# Patient Record
Sex: FEMALE | Race: OTHER
[De-identification: names, ages, dates, MRNs, and addresses within clinical notes are randomized per-mention and may not be internally consistent; named-entity substitution may affect disease eponyms.]

---

## 2018-08-12 ENCOUNTER — HOSPITAL ENCOUNTER (INPATIENT)
Dept: HOSPITAL 62 - ER | Age: 43
LOS: 3 days | Discharge: HOME | DRG: 639 | End: 2018-08-15
Attending: FAMILY MEDICINE | Admitting: FAMILY MEDICINE
Payer: SELF-PAY

## 2018-08-12 DIAGNOSIS — E10.10: Primary | ICD-10-CM

## 2018-08-12 DIAGNOSIS — R07.9: ICD-10-CM

## 2018-08-12 DIAGNOSIS — Z91.120: ICD-10-CM

## 2018-08-12 DIAGNOSIS — M54.5: ICD-10-CM

## 2018-08-12 LAB
ADD MANUAL DIFF: NO
ALBUMIN SERPL-MCNC: 5.1 G/DL (ref 3.5–5)
ALP SERPL-CCNC: 207 U/L (ref 38–126)
ALT SERPL-CCNC: 23 U/L (ref 9–52)
ANION GAP SERPL CALC-SCNC: 27 MMOL/L (ref 5–19)
APPEARANCE UR: (no result)
APTT PPP: YELLOW S
AST SERPL-CCNC: 10 U/L (ref 14–36)
BASE EXCESS BLDV CALC-SCNC: -21.1 MMOL/L
BASE EXCESS BLDV CALC-SCNC: -23.1 MMOL/L
BASOPHILS # BLD AUTO: 0 10^3/UL (ref 0–0.2)
BASOPHILS NFR BLD AUTO: 0.2 % (ref 0–2)
BILIRUB DIRECT SERPL-MCNC: 0.3 MG/DL (ref 0–0.4)
BILIRUB SERPL-MCNC: 0.8 MG/DL (ref 0.2–1.3)
BILIRUB UR QL STRIP: NEGATIVE
BUN SERPL-MCNC: 10 MG/DL (ref 7–20)
BUN SERPL-MCNC: 9 MG/DL (ref 7–20)
CALCIUM: 8.7 MG/DL (ref 8.4–10.2)
CALCIUM: 9.8 MG/DL (ref 8.4–10.2)
CHLORIDE SERPL-SCNC: 109 MMOL/L (ref 98–107)
CHLORIDE SERPL-SCNC: 116 MMOL/L (ref 98–107)
CK MB SERPL-MCNC: 0.58 NG/ML (ref ?–4.55)
CK SERPL-CCNC: 27 U/L (ref 30–135)
CO2 SERPL-SCNC: 6 MMOL/L (ref 22–30)
CO2 SERPL-SCNC: < 5 MMOL/L (ref 22–30)
EOSINOPHIL # BLD AUTO: 0 10^3/UL (ref 0–0.6)
EOSINOPHIL NFR BLD AUTO: 0 % (ref 0–6)
ERYTHROCYTE [DISTWIDTH] IN BLOOD BY AUTOMATED COUNT: 17.2 % (ref 11.5–14)
GLUCOSE SERPL-MCNC: 258 MG/DL (ref 75–110)
GLUCOSE SERPL-MCNC: 332 MG/DL (ref 75–110)
GLUCOSE UR STRIP-MCNC: >=500 MG/DL
HCO3 BLDV-SCNC: 8.6 MMOL/L (ref 20–32)
HCO3 BLDV-SCNC: 8.7 MMOL/L (ref 20–32)
HCT VFR BLD CALC: 53.6 % (ref 36–47)
HGB BLD-MCNC: 17.6 G/DL (ref 12–15.5)
KETONES UR STRIP-MCNC: 80 MG/DL
LYMPHOCYTES # BLD AUTO: 0.9 10^3/UL (ref 0.5–4.7)
LYMPHOCYTES NFR BLD AUTO: 11.1 % (ref 13–45)
MCH RBC QN AUTO: 32.4 PG (ref 27–33.4)
MCHC RBC AUTO-ENTMCNC: 32.8 G/DL (ref 32–36)
MCV RBC AUTO: 99 FL (ref 80–97)
MONOCYTES # BLD AUTO: 0.6 10^3/UL (ref 0.1–1.4)
MONOCYTES NFR BLD AUTO: 7.7 % (ref 3–13)
NEUTROPHILS # BLD AUTO: 6.3 10^3/UL (ref 1.7–8.2)
NEUTS SEG NFR BLD AUTO: 81 % (ref 42–78)
NITRITE UR QL STRIP: NEGATIVE
PCO2 BLDV: 33.5 MMHG (ref 35–63)
PCO2 BLDV: 39.4 MMHG (ref 35–63)
PH BLDV: 6.96 [PH] (ref 7.3–7.42)
PH BLDV: 7.03 [PH] (ref 7.3–7.42)
PH UR STRIP: 5 [PH] (ref 5–9)
PHOSPHATE SERPL-MCNC: 4.1 MG/DL (ref 2.5–4.5)
PLATELET # BLD: 273 10^3/UL (ref 150–450)
POTASSIUM SERPL-SCNC: 3.9 MMOL/L (ref 3.6–5)
POTASSIUM SERPL-SCNC: 4.6 MMOL/L (ref 3.6–5)
PROT SERPL-MCNC: 9 G/DL (ref 6.3–8.2)
PROT UR STRIP-MCNC: 100 MG/DL
RBC # BLD AUTO: 5.42 10^6/UL (ref 3.72–5.28)
SODIUM SERPL-SCNC: 139.7 MMOL/L (ref 137–145)
SODIUM SERPL-SCNC: 142.4 MMOL/L (ref 137–145)
SP GR UR STRIP: 1.03
TOTAL CELLS COUNTED % (AUTO): 100 %
TROPONIN I SERPL-MCNC: < 0.012 NG/ML
UROBILINOGEN UR-MCNC: NEGATIVE MG/DL (ref ?–2)
WBC # BLD AUTO: 7.8 10^3/UL (ref 4–10.5)

## 2018-08-12 PROCEDURE — 80048 BASIC METABOLIC PNL TOTAL CA: CPT

## 2018-08-12 PROCEDURE — 83605 ASSAY OF LACTIC ACID: CPT

## 2018-08-12 PROCEDURE — 96361 HYDRATE IV INFUSION ADD-ON: CPT

## 2018-08-12 PROCEDURE — 87040 BLOOD CULTURE FOR BACTERIA: CPT

## 2018-08-12 PROCEDURE — 80053 COMPREHEN METABOLIC PANEL: CPT

## 2018-08-12 PROCEDURE — 82962 GLUCOSE BLOOD TEST: CPT

## 2018-08-12 PROCEDURE — S0119 ONDANSETRON 4 MG: HCPCS

## 2018-08-12 PROCEDURE — 84443 ASSAY THYROID STIM HORMONE: CPT

## 2018-08-12 PROCEDURE — 36415 COLL VENOUS BLD VENIPUNCTURE: CPT

## 2018-08-12 PROCEDURE — 84100 ASSAY OF PHOSPHORUS: CPT

## 2018-08-12 PROCEDURE — 81025 URINE PREGNANCY TEST: CPT

## 2018-08-12 PROCEDURE — 85025 COMPLETE CBC W/AUTO DIFF WBC: CPT

## 2018-08-12 PROCEDURE — 84484 ASSAY OF TROPONIN QUANT: CPT

## 2018-08-12 PROCEDURE — 82803 BLOOD GASES ANY COMBINATION: CPT

## 2018-08-12 PROCEDURE — 83036 HEMOGLOBIN GLYCOSYLATED A1C: CPT

## 2018-08-12 PROCEDURE — 85379 FIBRIN DEGRADATION QUANT: CPT

## 2018-08-12 PROCEDURE — 36600 WITHDRAWAL OF ARTERIAL BLOOD: CPT

## 2018-08-12 PROCEDURE — 96374 THER/PROPH/DIAG INJ IV PUSH: CPT

## 2018-08-12 PROCEDURE — 82553 CREATINE MB FRACTION: CPT

## 2018-08-12 PROCEDURE — 81001 URINALYSIS AUTO W/SCOPE: CPT

## 2018-08-12 PROCEDURE — 71275 CT ANGIOGRAPHY CHEST: CPT

## 2018-08-12 PROCEDURE — 93010 ELECTROCARDIOGRAM REPORT: CPT

## 2018-08-12 PROCEDURE — 83735 ASSAY OF MAGNESIUM: CPT

## 2018-08-12 PROCEDURE — 99291 CRITICAL CARE FIRST HOUR: CPT

## 2018-08-12 PROCEDURE — 82010 KETONE BODYS QUAN: CPT

## 2018-08-12 PROCEDURE — 71046 X-RAY EXAM CHEST 2 VIEWS: CPT

## 2018-08-12 PROCEDURE — 93005 ELECTROCARDIOGRAM TRACING: CPT

## 2018-08-12 PROCEDURE — 82550 ASSAY OF CK (CPK): CPT

## 2018-08-12 RX ADMIN — ACETAMINOPHEN PRN MG: 325 TABLET ORAL at 23:46

## 2018-08-12 NOTE — RADIOLOGY REPORT (SQ)
EXAM DESCRIPTION:  CHEST 2 VIEWS



COMPLETED DATE/TIME:  8/12/2018 6:18 pm



REASON FOR STUDY:  cp, sob



COMPARISON:  None.



EXAM PARAMETERS:  NUMBER OF VIEWS: two views

TECHNIQUE: Digital Frontal and Lateral radiographic views of the chest acquired.

RADIATION DOSE: NA

LIMITATIONS: none



FINDINGS:  LUNGS AND PLEURA: No consolidation, pneumothorax or pleural effusion.

MEDIASTINUM AND HILAR STRUCTURES: No masses or contour abnormalities.

HEART AND VASCULAR STRUCTURES: Heart normal size.  No evidence for failure.

BONES: No acute findings.

HARDWARE: None in the chest.



IMPRESSION:  NO ACUTE RADIOGRAPHIC FINDING IN THE CHEST.



TECHNICAL DOCUMENTATION:  JOB ID:  [id number]

OH-64

 2011 [employer]- All Rights Reserved



Reading location - IP/workstation name: [email or web address]

## 2018-08-12 NOTE — RADIOLOGY REPORT (SQ)
EXAM DESCRIPTION:  CTA CHEST



COMPLETED DATE/TIME:  8/12/2018 7:19 pm



REASON FOR STUDY:  cp, sob, recent travel, elevated d dimer



COMPARISON:  Chest x-ray 8/12/2018.



TECHNIQUE:  CT scan of the chest performed using helical scanning technique with dynamic intravenous 
contrast injection.  Images reviewed with lung, soft tissue and bone windows.  Reconstructed coronal 
and sagittal MPR images reviewed.

Additional 3 dimensional post-processing performed to develop Maximal Intensity Projection images (MI
P).  All images stored on PACS.

All CT scanners at this facility use dose modulation, iterative reconstruction, and/or weight based d
osing when appropriate to reduce radiation dose to as low as reasonably achievable (ALARA).

[facility]: Dose Right  [facility]: CareDose    [facility]: Dose Right    [facility]: Teradose 4D    [facility]: Smart Technologies



CONTRAST TYPE AND DOSE:  contrast/concentration: Isovue 350.00 mg/ml; Total Contrast Delivered: 62.0 
ml; Total Saline Delivered: 103.0 ml

Contrast bolus optimized for the pulmonary arteries. Not diagnostic for the aorta.



RENAL FUNCTION:  Creatinine 0.56



RADIATION DOSE:  CT Rad equipment meets quality standard of care and radiation dose reduction techniq
ues were employed. CTDIvol: 14.6 - 16.5 mGy. DLP: 529 mGy-cm. .



LIMITATIONS:  None.



FINDINGS:  LUNGS AND PLEURA: No consolidation, pleural effusion or pneumothorax.

AORTA AND GREAT VESSELS: No thoracic aortic aneurysm.  Contrast bolus not optimized for the aorta.

HEART: No pericardial effusion. No significant coronary artery calcifications.

PULMONARY ARTERIES: No emboli visualized in the main pulmonary arteries or the segmental branches.

HILAR AND MEDIASTINAL STRUCTURES: No identified masses or abnormal nodes.

HARDWARE: None in the chest.

UPPER ABDOMEN: There is cholelithiasis.

THYROID AND OTHER SOFT TISSUES: The visualized thyroid gland is unremarkable.

BONES: No acute or significant finding.

3D MIPS: Confirm above findings.

OTHER: There are bilateral breast implants.



IMPRESSION:  1. No pulmonary emboli.  No acute findings in the chest.

2. Cholelithiasis.



COMMENT:  Quality ID # [id number]: Final reports with documentation of one or more dose reduction techniques
 (e.g., Automated exposure control, adjustment of the mA and/or kV according to patient size, use of 
iterative reconstruction technique)



TECHNICAL DOCUMENTATION:  JOB ID:  [id number]

OH-64

 [employer]- All Rights Reserved



Reading location - IP/workstation name: [email or web address]

## 2018-08-12 NOTE — ER DOCUMENT REPORT
ED Medical Screen (RME)





- General


Chief Complaint: Chest Pain


Stated Complaint: CHEST PAIN


Time Seen by Provider: 08/12/18 16:32


Mode of Arrival: Ambulatory


Information source: Patient, Critical access hospital Records


Notes: 





42-year-old female with type 1 diabetes presents with complaint of shortness of 

breath, chest pain, weakness, fatigue and nausea that have been ongoing for 

approximately 1 month.  Patient states that her shortness of breath has 

worsened over the last 4 days.  She describes pain with deep inspiration.  

Patient recently returned from [place] approximately 10 days ago after being 

there for 1 week.  Patient also states that she has not been on her diabetic 

medication for approximately 4 months due to her inability to pay.  She states 

that she did check her glucose last week and it read over 500.  Patient denies 

prior history of PE and DVT.





I have greeted and performed a rapid initial assessment of this patient.  A 

comprehensive ED assessment and evaluation of the patient, analysis of test 

results and completion of medical decision making process we will be contacted 

by additional ED providers.











PHYSICAL EXAMINATION:





GENERAL: Ill appearing, well-nourished and in no acute distress.





HEAD: Atraumatic, normocephalic.





EYES: Pupils equal round extraocular movements intact,  conjunctiva are normal.





ENT: Nares patent





NECK: Normal range of motion





LUNGS: No respiratory distress.  Conversational dyspnea





Musculoskeletal: Normal range of motion





NEUROLOGICAL:  Normal speech, normal gait. 





PSYCH: Normal mood, normal affect.





SKIN: Warm, Dry, normal turgor, no rashes or lesions noted.


TRAVEL OUTSIDE OF THE [place] IN LAST 30 DAYS: No


COUNTRY TRAVELED TO/FROM: [place]





- HPI


Onset: Other


Onset/Duration: Gradual, Persistent, Worse


Quality of pain: Burning


Severity: Moderate


Associated Symptoms: Body/muscle aches, Chest pain, Cough (nonproductive), Dizzy

/lightheaded, Nausea, Shortness of breath, Weakness


Exacerbated by: Movement, Walking


Relieved by: Denies


Similar symptoms previously: No


Recently seen / treated by doctor: No





- Related Data


Smoking: Non-smoker


Frequency of alcohol use: None


Drug Abuse: None


Allergies/Adverse Reactions: 


 





No Known Allergies Allergy (Unverified 08/12/18 16:17)


 











Past Medical History





- Social History


Chew tobacco use (# tins/day): No


Frequency of alcohol use: None


Drug Abuse: None


Renal/ Medical History: Denies: Hx Peritoneal Dialysis





Physical Exam





- Vital signs


Vitals: 





 











Temp Pulse Resp BP Pulse Ox


 


 98.5 F   118 H  20   145/91 H  100 


 


 08/12/18 16:28  08/12/18 16:28  08/12/18 16:28  08/12/18 16:28  08/12/18 16:28














Course





- Vital Signs


Vital signs: 





 











Temp Pulse Resp BP Pulse Ox


 


 98.5 F   118 H  20   145/91 H  100 


 


 08/12/18 16:28  08/12/18 16:28  08/12/18 16:28  08/12/18 16:28  08/12/18 16:28














Doctor's Discharge





- Discharge


Referrals: 


SANJUANA TORREZ MD [Primary Care Provider] - Follow up as needed

## 2018-08-12 NOTE — EKG REPORT
SEVERITY:- BORDERLINE ECG -

SINUS TACHYCARDIA

BORDERLINE T ABNORMALITIES, ANTERIOR LEADS

:

Confirmed by: Yenifer Christensen MD 12-Aug-2018 17:31:51

## 2018-08-12 NOTE — ER DOCUMENT REPORT
ED General





- General


Chief Complaint: Chest Pain


Stated Complaint: CHEST PAIN


Time Seen by Provider: 08/12/18 16:32


Mode of Arrival: Ambulatory


TRAVEL OUTSIDE OF THE [place] IN LAST 30 DAYS: No


COUNTRY TRAVELED TO/FROM: [place]





- [facility]


Notes: 





42-year-old female with history of diabetes presents with multiple complaints.  

She has been noncompliant with medications for diabetes due to not having 

insurance for the past several months.  Her BS was 500 last week. She traveled 

back from [place] about a week ago.  For the past 3 days, she has had 

significant fatigue and excessive sleepiness.  She developed diffuse low back 

pain radiating down both legs.  She denies any pain with urination or blood in 

her urine.  No vaginal discharge or bleeding.  No focal weakness or numbness.  

She has had nausea without vomiting.  No diarrhea or abdominal pain.  She 

developed headache today with neck pain.  She reports chest pain with deep 

breathing and significant shortness of breath with minimal activity.  She has a 

mild cough that has been nonproductive.  No hemoptysis.  No fevers or chills.  

Denies ill contacts.





- Related Data


Allergies/Adverse Reactions: 


 





No Known Allergies Allergy (Unverified 08/12/18 16:17)


 











Past Medical History





- General


Information source: Patient, [facility] Records





- Social History


Smoking Status: Never Smoker


Chew tobacco use (# tins/day): No


Frequency of alcohol use: None


Drug Abuse: None


Family History: Reviewed & Not Pertinent


Patient has suicidal ideation: No


Patient has homicidal ideation: No


Endocrine Medical History: Reports: Hx Diabetes Mellitus Type 1


Renal/ Medical History: Denies: Hx Peritoneal Dialysis





Review of Systems





- Review of Systems


Notes: 





Constitutional: Negative for fever or chills.  Positive for generalized 

weakness and excessive sleepiness


HENT: Negative for sore throat.


Eyes: Negative for visual changes.


Cardiovascular: Positive for chest pain with inspiration.


Respiratory: Positive for shortness of breath and cough.


Gastrointestinal: Negative for abdominal pain, vomiting or diarrhea.  Positive 

for nausea


Genitourinary: Negative for dysuria.


Musculoskeletal: Positive for neck and back pain.


Skin: Negative for rash.


Neurological: Positive for headaches and generalized weakness. negative for 

focal weakness or numbness.





10 point ROS negative except as marked above and in HPI.





Physical Exam





- Vital signs


Vitals: 


 











Temp Pulse Resp BP Pulse Ox


 


 98.5 F   118 H  20   145/91 H  100 


 


 08/12/18 16:28  08/12/18 16:28  08/12/18 16:28  08/12/18 16:28  08/12/18 16:28














- Notes


Notes: 





PHYSICAL EXAMINATION:


GENERAL: Ill-appearing, well-nourished.


HEAD: Atraumatic, normocephalic.


EYES: Pupils equal round and reactive to light, extraocular movements intact, 

conjunctiva are normal.


ENT: nares patent, oropharynx clear without exudates.  Dry mucous membranes.


NECK: Normal range of motion, supple without lymphadenopathy


LUNGS: Breath sounds clear to auscultation bilaterally and equal.  No wheezes 

rales or rhonchi.  Mild tachypnea


HEART: Tachycardic.  Normal rhythm.  No chest wall tenderness


ABDOMEN: Soft, nontender, normoactive bowel sounds.  No guarding, no rebound.  

No masses appreciated.


EXTREMITIES: Normal range of motion, no pitting or edema.  No cyanosis.  

Diffuse tenderness to palpation of lower back


NEUROLOGICAL: Cranial nerves grossly intact.  Normal speech, normal gait.  

Normal sensory and motor exams.


PSYCH: Normal mood, normal affect.


SKIN: Warm, Dry, normal turgor, no rashes or lesions noted.





Course





- Re-evaluation


Re-evalutation: 





08/12/18 18:18


Suspect DKA.  Fluids and insulin drip ordered.  D-dimer elevated with recent 

travel and tachycardia.  CT chest ordered.


08/12/18 19:01


Chest x-ray clear.  Lactic acid normal.  No suggestion of infection.  Low 

suspicion for meningitis.  CT pending.  Patient and family updated on need for  

ICU admission.  Hourly glucose ordered with BMP in 2 hours.


08/12/18 19:47


 hospitalist for admission. BS decreased to 234. D5 LR started at 250cc/hr. 

Oral potassium replaced.





- Vital Signs


Vital signs: 


 











Temp Pulse Resp BP Pulse Ox


 


 98.5 F   118 H  20   145/91 H  100 


 


 08/12/18 16:28  08/12/18 16:28  08/12/18 16:28  08/12/18 16:28  08/12/18 16:28














- Laboratory


Result Diagrams: 


 08/12/18 17:15





 08/12/18 17:15


Laboratory results interpreted by me: 


 











  08/12/18 08/12/18 08/12/18





  17:02 17:15 17:15


 


RBC   5.42 H 


 


Hgb   17.6 H 


 


Hct   53.6 H 


 


MCV   99 H 


 


RDW   17.2 H 


 


Seg Neutrophils %   81.0 H 


 


Lymphocytes %   11.1 L 


 


D-Dimer   


 


VBG pH   


 


VBG HCO3   


 


Chloride    109 H


 


Carbon Dioxide    6 L*


 


Anion Gap    27 H


 


Glucose    332 H


 


POC Glucose  307 H  


 


AST    10 L


 


Alkaline Phosphatase    207 H


 


Creatine Kinase    27 L


 


Total Protein    9.0 H


 


Albumin    5.1 H


 


Urine Protein   


 


Urine Glucose (UA)   


 


Urine Ketones   














  08/12/18 08/12/18 08/12/18





  17:15 17:15 17:15


 


RBC   


 


Hgb   


 


Hct   


 


MCV   


 


RDW   


 


Seg Neutrophils %   


 


Lymphocytes %   


 


D-Dimer    0.61 H


 


VBG pH  6.96 L*  


 


VBG HCO3  8.6 L  


 


Chloride   


 


Carbon Dioxide   


 


Anion Gap   


 


Glucose   


 


POC Glucose   


 


AST   


 


Alkaline Phosphatase   


 


Creatine Kinase   


 


Total Protein   


 


Albumin   


 


Urine Protein   100 H 


 


Urine Glucose (UA)   >=500 H 


 


Urine Ketones   80 H 














Critical Care Note





- Critical Care Note


Total time excluding time spent on procedures (mins): 45 - Critical care time 

spent obtaining history from patient or surrogate, discussions with consultants

, development of treatment plan with patient or surrogate, evaluation of patient

's response to treatment, examination of patient, ordering and performing 

treatments and interventions, ordering and review of laboratory studies, re-

evaluation of patient's condition, ordering and review of radiographic studies 

and review of old charts





Discharge





- Discharge


Clinical Impression: 


 DKA (diabetic ketoacidoses)





Condition: Critical


Disposition: ADMITTED AS INPATIENT


Admitting Provider: Hospitalist


Unit Admitted: ICU


Referrals: 


SANJUANA TORREZ MD [Primary Care Provider] - Follow up as needed

## 2018-08-13 LAB
ADD MANUAL DIFF: NO
ANION GAP SERPL CALC-SCNC: 11 MMOL/L (ref 5–19)
ANION GAP SERPL CALC-SCNC: 15 MMOL/L (ref 5–19)
ANION GAP SERPL CALC-SCNC: 6 MMOL/L (ref 5–19)
ANION GAP SERPL CALC-SCNC: 9 MMOL/L (ref 5–19)
ANION GAP SERPL CALC-SCNC: 9 MMOL/L (ref 5–19)
ARTERIAL BLOOD FIO2: (no result)
ARTERIAL BLOOD H2CO3: 0.92 MMOL/L (ref 1.05–1.35)
ARTERIAL BLOOD HCO3: 16.2 MMOL/L (ref 20–26)
ARTERIAL BLOOD PCO2: 30.5 MMHG (ref 35–45)
ARTERIAL BLOOD PH: 7.34 (ref 7.35–7.45)
ARTERIAL BLOOD PO2: 128.5 MMHG (ref 80–100)
ARTERIAL BLOOD TOTAL CO2: 17.1 MMOL/L (ref 21–25)
BASE EXCESS BLDA CALC-SCNC: -8.3 MMOL/L
BASOPHILS # BLD AUTO: 0 10^3/UL (ref 0–0.2)
BASOPHILS NFR BLD AUTO: 0.3 % (ref 0–2)
BUN SERPL-MCNC: 6 MG/DL (ref 7–20)
BUN SERPL-MCNC: 7 MG/DL (ref 7–20)
BUN SERPL-MCNC: 8 MG/DL (ref 7–20)
CALCIUM: 7.9 MG/DL (ref 8.4–10.2)
CALCIUM: 8.3 MG/DL (ref 8.4–10.2)
CALCIUM: 8.3 MG/DL (ref 8.4–10.2)
CALCIUM: 8.6 MG/DL (ref 8.4–10.2)
CALCIUM: 8.7 MG/DL (ref 8.4–10.2)
CHLORIDE SERPL-SCNC: 114 MMOL/L (ref 98–107)
CHLORIDE SERPL-SCNC: 116 MMOL/L (ref 98–107)
CHLORIDE SERPL-SCNC: 116 MMOL/L (ref 98–107)
CHLORIDE SERPL-SCNC: 117 MMOL/L (ref 98–107)
CHLORIDE SERPL-SCNC: 117 MMOL/L (ref 98–107)
CO2 SERPL-SCNC: 15 MMOL/L (ref 22–30)
CO2 SERPL-SCNC: 16 MMOL/L (ref 22–30)
CO2 SERPL-SCNC: 17 MMOL/L (ref 22–30)
CO2 SERPL-SCNC: 19 MMOL/L (ref 22–30)
CO2 SERPL-SCNC: 9 MMOL/L (ref 22–30)
EOSINOPHIL # BLD AUTO: 0 10^3/UL (ref 0–0.6)
EOSINOPHIL NFR BLD AUTO: 0.7 % (ref 0–6)
ERYTHROCYTE [DISTWIDTH] IN BLOOD BY AUTOMATED COUNT: 16.2 % (ref 11.5–14)
GLUCOSE SERPL-MCNC: 116 MG/DL (ref 75–110)
GLUCOSE SERPL-MCNC: 161 MG/DL (ref 75–110)
GLUCOSE SERPL-MCNC: 209 MG/DL (ref 75–110)
GLUCOSE SERPL-MCNC: 227 MG/DL (ref 75–110)
GLUCOSE SERPL-MCNC: 234 MG/DL (ref 75–110)
HCT VFR BLD CALC: 38.7 % (ref 36–47)
HGB BLD-MCNC: 13 G/DL (ref 12–15.5)
LYMPHOCYTES # BLD AUTO: 1 10^3/UL (ref 0.5–4.7)
LYMPHOCYTES NFR BLD AUTO: 18.3 % (ref 13–45)
MCH RBC QN AUTO: 31.5 PG (ref 27–33.4)
MCHC RBC AUTO-ENTMCNC: 33.7 G/DL (ref 32–36)
MCV RBC AUTO: 94 FL (ref 80–97)
MONOCYTES # BLD AUTO: 0.7 10^3/UL (ref 0.1–1.4)
MONOCYTES NFR BLD AUTO: 13.7 % (ref 3–13)
NEUTROPHILS # BLD AUTO: 3.6 10^3/UL (ref 1.7–8.2)
NEUTS SEG NFR BLD AUTO: 67 % (ref 42–78)
PHOSPHATE SERPL-MCNC: 0.7 MG/DL (ref 2.5–4.5)
PHOSPHATE SERPL-MCNC: 1.1 MG/DL (ref 2.5–4.5)
PLATELET # BLD: 179 10^3/UL (ref 150–450)
POTASSIUM SERPL-SCNC: 2.7 MMOL/L (ref 3.6–5)
POTASSIUM SERPL-SCNC: 2.8 MMOL/L (ref 3.6–5)
POTASSIUM SERPL-SCNC: 3.1 MMOL/L (ref 3.6–5)
POTASSIUM SERPL-SCNC: 3.3 MMOL/L (ref 3.6–5)
POTASSIUM SERPL-SCNC: 3.4 MMOL/L (ref 3.6–5)
RBC # BLD AUTO: 4.13 10^6/UL (ref 3.72–5.28)
SAO2 % BLDA: 98.4 % (ref 94–98)
SODIUM SERPL-SCNC: 140.4 MMOL/L (ref 137–145)
SODIUM SERPL-SCNC: 140.6 MMOL/L (ref 137–145)
SODIUM SERPL-SCNC: 140.9 MMOL/L (ref 137–145)
SODIUM SERPL-SCNC: 141.6 MMOL/L (ref 137–145)
SODIUM SERPL-SCNC: 142.2 MMOL/L (ref 137–145)
TOTAL CELLS COUNTED % (AUTO): 100 %
WBC # BLD AUTO: 5.3 10^3/UL (ref 4–10.5)

## 2018-08-13 RX ADMIN — POTASSIUM CHLORIDE SCH MLS/HR: 29.8 INJECTION, SOLUTION INTRAVENOUS at 13:54

## 2018-08-13 RX ADMIN — DEXTROSE MONOHYDRATE, SODIUM CHLORIDE, AND POTASSIUM CHLORIDE PRN MLS/HR: 50; 9; 1.49 INJECTION, SOLUTION INTRAVENOUS at 09:49

## 2018-08-13 RX ADMIN — MAGNESIUM SULFATE IN DEXTROSE SCH MLS/HR: 10 INJECTION, SOLUTION INTRAVENOUS at 22:05

## 2018-08-13 RX ADMIN — LANSOPRAZOLE SCH MG: 15 TABLET, ORALLY DISINTEGRATING, DELAYED RELEASE ORAL at 17:48

## 2018-08-13 RX ADMIN — INSULIN LISPRO PRN UNITS: 100 INJECTION, SOLUTION INTRAVENOUS; SUBCUTANEOUS at 22:26

## 2018-08-13 RX ADMIN — MAGNESIUM SULFATE IN DEXTROSE SCH MLS/HR: 10 INJECTION, SOLUTION INTRAVENOUS at 23:19

## 2018-08-13 RX ADMIN — DEXTROSE MONOHYDRATE, SODIUM CHLORIDE, AND POTASSIUM CHLORIDE PRN MLS/HR: 50; 9; 1.49 INJECTION, SOLUTION INTRAVENOUS at 13:57

## 2018-08-13 RX ADMIN — DEXTROSE MONOHYDRATE, SODIUM CHLORIDE, AND POTASSIUM CHLORIDE PRN MLS/HR: 50; 9; 1.49 INJECTION, SOLUTION INTRAVENOUS at 05:32

## 2018-08-13 RX ADMIN — POTASSIUM CHLORIDE SCH MEQ: 750 TABLET, FILM COATED, EXTENDED RELEASE ORAL at 09:49

## 2018-08-13 RX ADMIN — INSULIN LISPRO PRN UNITS: 100 INJECTION, SOLUTION INTRAVENOUS; SUBCUTANEOUS at 19:34

## 2018-08-13 RX ADMIN — POTASSIUM CHLORIDE SCH MLS/HR: 29.8 INJECTION, SOLUTION INTRAVENOUS at 11:57

## 2018-08-13 RX ADMIN — ENOXAPARIN SODIUM SCH MG: 40 INJECTION SUBCUTANEOUS at 09:53

## 2018-08-13 RX ADMIN — POTASSIUM CHLORIDE SCH MLS/HR: 29.8 INJECTION, SOLUTION INTRAVENOUS at 10:02

## 2018-08-13 NOTE — PDOC H&P
History of Present Illness


Admission Date/PCP: 


  08/12/18 20:13





  





Patient complains of: Burning sensation in chest, hyperglycemia


History of Present Illness: 


MIGUEL VILLALPANDO is a 42 year old female with a known PMH of DM on insulin 

which she states she stopped taking 5 months ago secondary to lack of insurance 

presenting to ER with a burning sensation in her chest and hyperglycemia. 

States she has been having intermittent chest burning associated with shortness 

of breath x 1 month with worsening over the past 4 days. She was diagnosed with 

DM in 2012 and was on an insulin pump which she discontinued 5 months ago given 

she can not afford it and has no insurance. Patient also has no PCP. 








Past Medical History


Endocrine Medical History: Reports: Diabetes Mellitus Type 1





Past Surgical History


Past Surgical History: Reports: None





Social History


Information Source: Patient


Lives with: Family


Smoking Status: Never Smoker


Drugs: None





Family History


Family History: Reviewed & Not Pertinent


Parental Family History Reviewed: Yes


Children Family History Reviewed: Yes


Sibling(s) Family History Reviewed.: Yes





Medication/Allergy


Allergies/Adverse Reactions: 


 





No Known Allergies Allergy (Unverified 08/12/18 16:17)


 











Review of Systems


Constitutional: PRESENT: weakness.  ABSENT: chills, fever(s), night sweats


Cardiovascular: PRESENT: chest pain, dyspnea on exertion


Respiratory: ABSENT: cough, hemoptysis


Gastrointestinal: ABSENT: abdominal pain


Musculoskeletal: PRESENT: back pain





Physical Exam


Vital Signs: 


 











Temp Pulse Resp BP Pulse Ox


 


 98.5 F   118 H  15   124/99 H  100 


 


 08/12/18 16:28  08/12/18 16:28  08/12/18 20:04  08/12/18 20:04  08/12/18 20:04











General appearance: PRESENT: no acute distress


Head exam: PRESENT: atraumatic, normocephalic


Eye exam: PRESENT: conjunctiva pink, EOMI, PERRLA.  ABSENT: scleral icterus


Ear exam: PRESENT: normal external ear exam


Mouth exam: PRESENT: moist, tongue midline


Neck exam: ABSENT: carotid bruit, JVD, lymphadenopathy, thyromegaly


Respiratory exam: PRESENT: clear to auscultation sara.  ABSENT: rales, rhonchi, 

wheezes


Cardiovascular exam: PRESENT: RRR.  ABSENT: diastolic murmur, rubs, systolic 

murmur


Pulses: PRESENT: normal dorsalis pedis pul


Vascular exam: PRESENT: normal capillary refill


GI/Abdominal exam: PRESENT: normal bowel sounds, soft.  ABSENT: distended, 

guarding, mass, organolmegaly, rebound, tenderness


Rectal exam: PRESENT: deferred


Extremities exam: PRESENT: full ROM.  ABSENT: calf tenderness, clubbing, pedal 

edema


Musculoskeletal exam: PRESENT: full ROM, tenderness - paraspinal muscles 

bilateral lumbar region


Neurological exam: PRESENT: alert, awake, oriented to person, oriented to place

, oriented to time, oriented to situation, CN II-XII grossly intact.  ABSENT: 

motor sensory deficit


Psychiatric exam: PRESENT: appropriate affect, normal mood.  ABSENT: homicidal 

ideation, suicidal ideation


Skin exam: PRESENT: dry, intact, warm.  ABSENT: cyanosis, rash





Results


Laboratory Results: 


 











  08/12/18





  21:37


 


VBG pH  7.03 L*


 


VBG pCO2  33.5 L


 


VBG HCO3  8.7 L


 


VBG Base Excess  -21.1











Impressions: 


 





Chest X-Ray  08/12/18 17:48


IMPRESSION:  NO ACUTE RADIOGRAPHIC FINDING IN THE CHEST.


 








Chest/Abdomen CTA  08/12/18 18:12


IMPRESSION:  1. No pulmonary emboli.  No acute findings in the chest.


2. Cholelithiasis.


 














Assessment & Plan





- Diagnosis


(1) DKA (diabetic ketoacidoses)


Qualifiers: 


   Diabetes mellitus type: type 1   Diabetes mellitus complication detail: 

without coma   Qualified Code(s): E10.10 - Type 1 diabetes mellitus with 

ketoacidosis without coma   


Is this a current diagnosis for this admission?: Yes   


Plan: 


Patient to be admitted to the ICU. Started on DKA protocol, with insulin drip. 

Titration as per protocol. continue with q1h BS checks. q4h BMP checks. 

Continue with fluid resuscitation. Will switch fluids to D5 1/2NS + 20 meq Kcl 

once BS between 150-250. 1amp bicarb to be given. Repeat pH appears to be 

improving. Will continue to monitor closely. 








(2) Poorly controlled diabetes mellitus


Is this a current diagnosis for this admission?: Yes   


Plan: 


Patient is in need of diabetic education as well as assistance with access to 

medications.  consult pending for this AM.








(3) Chest pain, rule out acute myocardial infarction


Is this a current diagnosis for this admission?: Yes   


Plan: 


trop negative x 1. will continue to trend q6h x 3 or until wnl. no acute chest 

pain at this time. EKG unremarkable. nitro, morphine prn chest pain. will 

continue to monitor. 








- Time


Time Spent: 30 to 50 Minutes


Critical Time spent with patient: 25-34 minutes


Medications reviewed and adjusted accordingly: Yes


Anticipated discharge: Home

## 2018-08-13 NOTE — PDOC PROGRESS REPORT
Subjective


Progress Note for:: 08/13/18


Subjective:: 





42 y.o. F who presented to [facility] ED for midsternal "burning" and SOB x 1 month. 

She has a PMH of DM type 1 (diagnosed in her 30s) and stopped taking her 

insulin 5 months ago because she could no longer afford her insulin pump. 





The patient was seen this morning on rounds, she is resting comfortably in bed 

on room air.  The patient states that her chest pain has resolved, she no 

longer feels short of breath or nauseated. Family is at the bedside. Lung 

sounds are clear to auscultation.  S1-S2.  NSR on cardiac telemetry.  Pulses in 

upper and lower extremities.  Positive bowel sounds.  Abdomen is soft, nontender

, nondistended. The patient remains on an insulin gtt. Repeat ABG shows the 

patient is no longer acidotic, anion gap still open. 





Plan to admit to [facility] on insulin gtt protocol


Reason For Visit: 


DKA,CHEST PAIN








Physical Exam


Vital Signs: 


 











Temp Pulse Resp BP Pulse Ox


 


 98.2 F   100   18   94/63 L  100 


 


 08/13/18 16:39  08/13/18 16:39  08/13/18 16:39  08/13/18 16:39  08/13/18 16:39








 Intake & Output











 08/12/18 08/13/18 08/14/18





 06:59 06:59 06:59


 


Intake Total  3400 2107


 


Balance  3400 2107











General appearance: PRESENT: no acute distress, well-developed, well-nourished


Head exam: PRESENT: atraumatic


Eye exam: PRESENT: conjunctiva pink, PERRLA


Mouth exam: PRESENT: moist, tongue midline


Neck exam: PRESENT: full ROM


Respiratory exam: PRESENT: clear to auscultation sara, symmetrical, unlabored


Cardiovascular exam: PRESENT: RRR, +S1, +S2


Pulses: PRESENT: normal radial pulses, normal dorsalis pedis pul


Vascular exam: PRESENT: normal capillary refill


GI/Abdominal exam: PRESENT: normal bowel sounds, soft.  ABSENT: tenderness


Rectal exam: PRESENT: deferred


Extremities exam: PRESENT: full ROM.  ABSENT: joint swelling, pedal edema


Musculoskeletal exam: PRESENT: ambulatory, full ROM


Neurological exam: PRESENT: alert, awake, oriented to person, oriented to place

, oriented to time, oriented to situation


Psychiatric exam: PRESENT: appropriate affect


Skin exam: PRESENT: dry, intact, normal color





Results


Laboratory Results: 


 





 08/13/18 03:55 





 08/13/18 13:30 





 











  08/12/18 08/13/18 08/13/18





  21:37 00:15 03:55


 


WBC    5.3


 


RBC    4.13


 


Hgb    13.0  D


 


Hct    38.7


 


MCV    94  D


 


MCH    31.5


 


MCHC    33.7


 


RDW    16.2 H


 


Plt Count    179


 


Seg Neutrophils %    67.0


 


Lymphocytes %    18.3


 


Monocytes %    13.7 H


 


Eosinophils %    0.7


 


Basophils %    0.3


 


Absolute Neutrophils    3.6


 


Absolute Lymphocytes    1.0


 


Absolute Monocytes    0.7


 


Absolute Eosinophils    0.0


 


Absolute Basophils    0.0


 


Carbonic Acid   


 


HCO3/H2CO3 Ratio   


 


ABG pH   


 


ABG pCO2   


 


ABG pO2   


 


ABG HCO3   


 


ABG O2 Saturation   


 


ABG Base Excess   


 


VBG pH  7.03 L*  


 


VBG pCO2  33.5 L  


 


VBG HCO3  8.7 L  


 


VBG Base Excess  -21.1  


 


FiO2   


 


Sodium   140.6 


 


Potassium   3.1 L 


 


Chloride   117 H 


 


Carbon Dioxide   9 L* 


 


Anion Gap   15 


 


BUN   8 


 


Creatinine   0.37 L 


 


Est GFR ( Amer)   > 60 


 


Est GFR (Non-Af Amer)   > 60 


 


Glucose   209 H 


 


Calcium   8.6 


 


Phosphorus   


 


Magnesium   














  08/13/18 08/13/18 08/13/18





  03:55 07:42 10:50


 


WBC   


 


RBC   


 


Hgb   


 


Hct   


 


MCV   


 


MCH   


 


MCHC   


 


RDW   


 


Plt Count   


 


Seg Neutrophils %   


 


Lymphocytes %   


 


Monocytes %   


 


Eosinophils %   


 


Basophils %   


 


Absolute Neutrophils   


 


Absolute Lymphocytes   


 


Absolute Monocytes   


 


Absolute Eosinophils   


 


Absolute Basophils   


 


Carbonic Acid    0.92 L


 


HCO3/H2CO3 Ratio    17:1


 


ABG pH    7.34 L


 


ABG pCO2    30.5 L


 


ABG pO2    128.5 H


 


ABG HCO3    16.2 L


 


ABG O2 Saturation    98.4 H


 


ABG Base Excess    -8.3


 


VBG pH   


 


VBG pCO2   


 


VBG HCO3   


 


VBG Base Excess   


 


FiO2    21%


 


Sodium  140.4  140.9 


 


Potassium  2.8 L*  2.7 L* 


 


Chloride  116 H  116 H 


 


Carbon Dioxide  15 L  16 L 


 


Anion Gap  9  9 


 


BUN  6 L  6 L 


 


Creatinine  0.33 L  0.33 L 


 


Est GFR ( Amer)  > 60  > 60 


 


Est GFR (Non-Af Amer)  > 60  > 60 


 


Glucose  161 H  116 H 


 


Calcium  8.3 L  7.9 L 


 


Phosphorus   


 


Magnesium   














  08/13/18 08/13/18





  13:30 13:30


 


WBC  


 


RBC  


 


Hgb  


 


Hct  


 


MCV  


 


MCH  


 


MCHC  


 


RDW  


 


Plt Count  


 


Seg Neutrophils %  


 


Lymphocytes %  


 


Monocytes %  


 


Eosinophils %  


 


Basophils %  


 


Absolute Neutrophils  


 


Absolute Lymphocytes  


 


Absolute Monocytes  


 


Absolute Eosinophils  


 


Absolute Basophils  


 


Carbonic Acid  


 


HCO3/H2CO3 Ratio  


 


ABG pH  


 


ABG pCO2  


 


ABG pO2  


 


ABG HCO3  


 


ABG O2 Saturation  


 


ABG Base Excess  


 


VBG pH  


 


VBG pCO2  


 


VBG HCO3  


 


VBG Base Excess  


 


FiO2  


 


Sodium  142.2 


 


Potassium  3.3 L 


 


Chloride  117 H 


 


Carbon Dioxide  19 L 


 


Anion Gap  6 


 


BUN  7 


 


Creatinine  0.34 L 


 


Est GFR ( Amer)  > 60 


 


Est GFR (Non-Af Amer)  > 60 


 


Glucose  227 H 


 


Calcium  8.3 L 


 


Phosphorus  0.7 L D 


 


Magnesium   1.4 L











Impressions: 


 





Chest X-Ray  08/12/18 17:48


IMPRESSION:  NO ACUTE RADIOGRAPHIC FINDING IN THE CHEST.


 








Chest/Abdomen CTA  08/12/18 18:12


IMPRESSION:  1. No pulmonary emboli.  No acute findings in the chest.


2. Cholelithiasis.


 











Status: Imported from PACS





Assessment & Plan





- Diagnosis


(1) DKA (diabetic ketoacidoses)


Qualifiers: 


   Diabetes mellitus type: type 1   Diabetes mellitus complication detail: 

without coma   Qualified Code(s): E10.10 - Type 1 diabetes mellitus with 

ketoacidosis without coma   


Is this a current diagnosis for this admission?: Yes   


Plan: 


Secondary to noncompliance with insulin.


Initial  Anion Gap 27 ph 6.9 on VBG


1 amp bicarb given, repeat pH is 7.34 on ABG


Continue insulin drip per protocol


Serial BMP checks every 6 hours


Maintenance IVF d51/2NS 20KCL





Plan to discontinue insulin gtt when BG < 150


Will place on sliding scale insulin at that time











(2) Chest pain


Is this a current diagnosis for this admission?: Yes   


Plan: 


Unclear etiology but appears to be noncardiac in nature


Troponin<0.012 and EKG shows NSR, no signs of ischemia or infarction


Patient's only risk factor is diabetes, she is otherwise without multiple 

comorbidities


No plan to consult cardiology


Tylenol and flexeril PRN for chest wall pain


Xanax PRN for anxiety


TUMS PRN and BID lansoprazole for GERD











- Time


Time Spent with patient: 15-24 minutes


Medications reviewed and adjusted accordingly: Yes


Anticipated discharge: Home





- Inpatient Certification


Based on my medical assessment, after consideration of the patient's 

comorbidities, presenting symptoms, or acuity I expect that the services needed 

warrant INPATIENT care.: Yes


I certify that my determination is in accordance with my understanding of 

Medicare's requirements for reasonable and necessary INPATIENT services [42 CFR 

412.3e].: Yes


Medical Necessity: Risk of Complication if Not Cared For in Hospital





- Plan Summary


Plan Summary: 


Admit to IMCU on insulin GTT.  Serial BMP checks every 6 hours.

## 2018-08-14 LAB
ANION GAP SERPL CALC-SCNC: 10 MMOL/L (ref 5–19)
ANION GAP SERPL CALC-SCNC: 8 MMOL/L (ref 5–19)
ANION GAP SERPL CALC-SCNC: 8 MMOL/L (ref 5–19)
ANION GAP SERPL CALC-SCNC: 9 MMOL/L (ref 5–19)
BUN SERPL-MCNC: 3 MG/DL (ref 7–20)
BUN SERPL-MCNC: 4 MG/DL (ref 7–20)
BUN SERPL-MCNC: 5 MG/DL (ref 7–20)
BUN SERPL-MCNC: 7 MG/DL (ref 7–20)
CALCIUM: 8.2 MG/DL (ref 8.4–10.2)
CALCIUM: 8.3 MG/DL (ref 8.4–10.2)
CHLORIDE SERPL-SCNC: 109 MMOL/L (ref 98–107)
CHLORIDE SERPL-SCNC: 110 MMOL/L (ref 98–107)
CHLORIDE SERPL-SCNC: 112 MMOL/L (ref 98–107)
CHLORIDE SERPL-SCNC: 112 MMOL/L (ref 98–107)
CO2 SERPL-SCNC: 19 MMOL/L (ref 22–30)
CO2 SERPL-SCNC: 20 MMOL/L (ref 22–30)
CO2 SERPL-SCNC: 22 MMOL/L (ref 22–30)
CO2 SERPL-SCNC: 25 MMOL/L (ref 22–30)
GLUCOSE SERPL-MCNC: 157 MG/DL (ref 75–110)
GLUCOSE SERPL-MCNC: 214 MG/DL (ref 75–110)
GLUCOSE SERPL-MCNC: 283 MG/DL (ref 75–110)
GLUCOSE SERPL-MCNC: 298 MG/DL (ref 75–110)
PHOSPHATE SERPL-MCNC: 0.9 MG/DL (ref 2.5–4.5)
PHOSPHATE SERPL-MCNC: 1.9 MG/DL (ref 2.5–4.5)
PHOSPHATE SERPL-MCNC: 2 MG/DL (ref 2.5–4.5)
PHOSPHATE SERPL-MCNC: 2.4 MG/DL (ref 2.5–4.5)
POTASSIUM SERPL-SCNC: 3.1 MMOL/L (ref 3.6–5)
POTASSIUM SERPL-SCNC: 3.3 MMOL/L (ref 3.6–5)
POTASSIUM SERPL-SCNC: 3.6 MMOL/L (ref 3.6–5)
POTASSIUM SERPL-SCNC: 3.7 MMOL/L (ref 3.6–5)
SODIUM SERPL-SCNC: 138.9 MMOL/L (ref 137–145)
SODIUM SERPL-SCNC: 141 MMOL/L (ref 137–145)
SODIUM SERPL-SCNC: 141.7 MMOL/L (ref 137–145)
SODIUM SERPL-SCNC: 142.2 MMOL/L (ref 137–145)

## 2018-08-14 RX ADMIN — INSULIN GLARGINE SCH UNITS: 100 INJECTION, SOLUTION SUBCUTANEOUS at 09:12

## 2018-08-14 RX ADMIN — INSULIN LISPRO PRN UNITS: 100 INJECTION, SOLUTION INTRAVENOUS; SUBCUTANEOUS at 17:00

## 2018-08-14 RX ADMIN — ENOXAPARIN SODIUM SCH MG: 40 INJECTION SUBCUTANEOUS at 09:12

## 2018-08-14 RX ADMIN — LANSOPRAZOLE SCH MG: 15 TABLET, ORALLY DISINTEGRATING, DELAYED RELEASE ORAL at 17:00

## 2018-08-14 RX ADMIN — ACETAMINOPHEN PRN MG: 325 TABLET ORAL at 07:16

## 2018-08-14 RX ADMIN — LANSOPRAZOLE SCH MG: 15 TABLET, ORALLY DISINTEGRATING, DELAYED RELEASE ORAL at 05:44

## 2018-08-14 RX ADMIN — INSULIN LISPRO SCH UNITS: 100 INJECTION, SOLUTION INTRAVENOUS; SUBCUTANEOUS at 17:01

## 2018-08-14 RX ADMIN — INSULIN LISPRO PRN UNITS: 100 INJECTION, SOLUTION INTRAVENOUS; SUBCUTANEOUS at 22:32

## 2018-08-14 RX ADMIN — POTASSIUM CHLORIDE SCH MEQ: 750 TABLET, FILM COATED, EXTENDED RELEASE ORAL at 09:12

## 2018-08-14 RX ADMIN — ACETAMINOPHEN PRN MG: 325 TABLET ORAL at 17:01

## 2018-08-14 RX ADMIN — CALCIUM CARBONATE-CHOLECALCIFEROL TAB 250 MG-125 UNIT SCH TAB: 250-125 TAB at 09:13

## 2018-08-14 RX ADMIN — INSULIN LISPRO SCH UNITS: 100 INJECTION, SOLUTION INTRAVENOUS; SUBCUTANEOUS at 14:05

## 2018-08-14 NOTE — PDOC PROGRESS REPORT
Subjective


Progress Note for:: 08/14/18


Subjective:: 


The patient is a 42-year-old female with a past medical history of insulin-

dependent diabetes mellitus who was admitted on 8/12/18 for atypical chest pain 

and DKA.





The patient is seen on morning rounds.  She is found resting in bed comfortably 

on room air.  Her only complaint at present is a headache which has responded 

fairly well to Tylenol.  


She denies fever, chills, body aches, chest pain, palpitations, dyspnea, 

orthopnea, cough, abdominal pain, nausea vomiting diarrhea.


She is very anxious about transitioning to intermittent insulin after having 

utilized an insulin pump for several years.  However, she is unable to afford 

the insulin for her pump at this time understands that she requires insulin to 

prevent future complications.


The patient met with the diabetic educator today and requests that she remain 

inpatient so that she can manage her insulin for dinner and breakfast to be 

certain that she has a full understanding of the new regiment.  Otherwise, she 

has no concerns.


No concerns per nursing.





Reason For Visit: 


DKA,CHEST PAIN








Physical Exam


Vital Signs: 


 











Temp Pulse Resp BP Pulse Ox


 


 98.0 F   79   16   110/70   100 


 


 08/14/18 16:26  08/14/18 16:26  08/14/18 16:26  08/14/18 16:26  08/14/18 16:26








 Intake & Output











 08/13/18 08/14/18 08/15/18





 06:59 06:59 06:59


 


Intake Total 3400 3598 250


 


Balance 3400 3598 250











General appearance: PRESENT: no acute distress, cooperative, thin, well-

developed, well-nourished


Head exam: PRESENT: atraumatic, normocephalic


Eye exam: PRESENT: conjunctiva pink, EOMI, PERRLA.  ABSENT: scleral icterus


Ear exam: PRESENT: normal external ear exam


Mouth exam: PRESENT: moist, tongue midline


Neck exam: ABSENT: carotid bruit, JVD, lymphadenopathy, thyromegaly


Respiratory exam: PRESENT: clear to auscultation sara, symmetrical, unlabored.  

ABSENT: rales, rhonchi, wheezes


Cardiovascular exam: PRESENT: RRR.  ABSENT: diastolic murmur, rubs, systolic 

murmur


Pulses: PRESENT: normal dorsalis pedis pul


Vascular exam: PRESENT: normal capillary refill


GI/Abdominal exam: PRESENT: normal bowel sounds, soft.  ABSENT: distended, 

guarding, mass, organolmegaly, rebound, tenderness


Rectal exam: PRESENT: deferred


Extremities exam: PRESENT: full ROM.  ABSENT: calf tenderness, clubbing, pedal 

edema


Neurological exam: PRESENT: alert, awake, oriented to person, oriented to place

, oriented to time, oriented to situation, CN II-XII grossly intact.  ABSENT: 

motor sensory deficit


Psychiatric exam: PRESENT: appropriate affect, normal mood.  ABSENT: homicidal 

ideation, suicidal ideation


Skin exam: PRESENT: dry, intact, warm.  ABSENT: cyanosis, rash





Results


Laboratory Results: 


 





 08/13/18 03:55 





 08/14/18 11:48 





 











  08/13/18 08/14/18 08/14/18





  18:11 00:23 06:11


 


Sodium  141.6  138.9  142.2


 


Potassium  3.4 L  3.1 L  3.6


 


Chloride  114 H  112 H  112 H


 


Carbon Dioxide  17 L  19 L  20 L


 


Anion Gap  11  8  10


 


BUN  6 L  5 L  3 L


 


Creatinine  0.37 L  0.35 L  0.36 L


 


Est GFR ( Amer)  > 60  > 60  > 60


 


Est GFR (Non-Af Amer)  > 60  > 60  > 60


 


Glucose  234 H  214 H  157 H


 


Calcium  8.7  8.2 L  8.3 L


 


Phosphorus  1.1 L  0.9 L  2.0 L


 


Magnesium  1.7  2.5 H  2.2














  08/14/18





  11:48


 


Sodium  141.0


 


Potassium  3.7


 


Chloride  110 H


 


Carbon Dioxide  22


 


Anion Gap  9


 


BUN  4 L


 


Creatinine  0.41 L


 


Est GFR ( Amer)  > 60


 


Est GFR (Non-Af Amer)  > 60


 


Glucose  298 H


 


Calcium  8.2 L


 


Phosphorus  1.9 L


 


Magnesium  2.0











Impressions: 


 





Chest X-Ray  08/12/18 17:48


IMPRESSION:  NO ACUTE RADIOGRAPHIC FINDING IN THE CHEST.


 








Chest/Abdomen CTA  08/12/18 18:12


IMPRESSION:  1. No pulmonary emboli.  No acute findings in the chest.


2. Cholelithiasis.


 














Assessment & Plan





- Diagnosis


(1) DKA (diabetic ketoacidoses)


Qualifiers: 


   Diabetes mellitus type: type 1   Diabetes mellitus complication detail: 

without coma   Qualified Code(s): E10.10 - Type 1 diabetes mellitus with 

ketoacidosis without coma   


Is this a current diagnosis for this admission?: Yes   


Plan: 


The patient was initially admitted with a glucose of 332, bicarb of 6, and 

anion gap of 27.


She was initially placed on an insulin drip with aggressive IV fluid 

rehydration.


Electrolytes were monitored every 6 hours and replaced as needed.


Her anion gap has subsequently closed and bicarb has improved to 22.





The patient is transitioned to a consistent carb diet.


Placed on Lantus 10 units daily.


She has met with the diabetic educator with recommendations that her Humalog 

coverage be a combination of carb counting with sliding scale coverage to 

closely match her previous insulin pump settings.  She is placed on Humalog 1 

unit per 20 g of carb at meals with sliding scale at meals and each S.


We will continue to monitor chemistry overnight while patient self administers 

insulin to ensure adequate dosing.


Hypoglycemia protocols are in place.





Anticipate discharge tomorrow morning.








(2) Poorly controlled diabetes mellitus


Is this a current diagnosis for this admission?: Yes   


Plan: 


Hemoglobin A1c found to be 10.1%.


The patient reports that she has been out of insulin for nearly 5 months due to 

being unable to afford her insulin pump refills.


Medication adjustments as above.


Discharge planning has been consulted; appreciate their assistance in obtaining 

low-cost medication for patient as well as in establishing with a local primary 

care provider.


Appreciate the diabetic educator's assistance.








(3) Chest pain, rule out acute myocardial infarction


Is this a current diagnosis for this admission?: Yes   


Plan: 


The patient complained of atypical chest pain described as burning without 

associated symptoms and found to be in DKA.  Low suspicion for ACS.


Chest x-ray is benign.


CT of the abdomen and chest was negative for PE and acute pulmonary processes; 

did incidentally find cholelithiasis.


EKG demonstrated normal sinus rhythm.


Initial troponin is negative.








- Time


Time Spent with patient: 25-34 minutes


Medications reviewed and adjusted accordingly: Yes


Anticipated discharge: Home


Within: within 24 hours

## 2018-08-15 VITALS — DIASTOLIC BLOOD PRESSURE: 59 MMHG | SYSTOLIC BLOOD PRESSURE: 101 MMHG

## 2018-08-15 RX ADMIN — POTASSIUM CHLORIDE SCH MEQ: 750 TABLET, FILM COATED, EXTENDED RELEASE ORAL at 09:32

## 2018-08-15 RX ADMIN — INSULIN LISPRO SCH UNITS: 100 INJECTION, SOLUTION INTRAVENOUS; SUBCUTANEOUS at 09:31

## 2018-08-15 RX ADMIN — ACETAMINOPHEN PRN MG: 325 TABLET ORAL at 05:10

## 2018-08-15 RX ADMIN — CALCIUM CARBONATE-CHOLECALCIFEROL TAB 250 MG-125 UNIT SCH TAB: 250-125 TAB at 09:33

## 2018-08-15 RX ADMIN — INSULIN LISPRO PRN UNITS: 100 INJECTION, SOLUTION INTRAVENOUS; SUBCUTANEOUS at 09:35

## 2018-08-15 RX ADMIN — LANSOPRAZOLE SCH MG: 15 TABLET, ORALLY DISINTEGRATING, DELAYED RELEASE ORAL at 05:10

## 2018-08-15 RX ADMIN — INSULIN GLARGINE SCH UNITS: 100 INJECTION, SOLUTION SUBCUTANEOUS at 09:32

## 2018-08-15 RX ADMIN — ENOXAPARIN SODIUM SCH MG: 40 INJECTION SUBCUTANEOUS at 09:33

## 2022-08-02 ENCOUNTER — AMBULATORY SURGICAL CENTER (OUTPATIENT)
Dept: URBAN - METROPOLITAN AREA SURGERY 4 | Facility: SURGERY | Age: 47
End: 2022-08-02

## 2022-08-02 ENCOUNTER — OFFICE (OUTPATIENT)
Dept: URBAN - METROPOLITAN AREA PATHOLOGY 1 | Facility: PATHOLOGY | Age: 47
End: 2022-08-02

## 2022-08-02 VITALS
HEART RATE: 92 BPM | SYSTOLIC BLOOD PRESSURE: 127 MMHG | OXYGEN SATURATION: 100 % | HEIGHT: 62 IN | RESPIRATION RATE: 21 BRPM | DIASTOLIC BLOOD PRESSURE: 91 MMHG | RESPIRATION RATE: 17 BRPM | SYSTOLIC BLOOD PRESSURE: 146 MMHG | RESPIRATION RATE: 17 BRPM | HEART RATE: 71 BPM | DIASTOLIC BLOOD PRESSURE: 95 MMHG | RESPIRATION RATE: 85 BRPM | SYSTOLIC BLOOD PRESSURE: 130 MMHG | HEART RATE: 71 BPM | SYSTOLIC BLOOD PRESSURE: 150 MMHG | DIASTOLIC BLOOD PRESSURE: 91 MMHG | HEART RATE: 85 BPM | SYSTOLIC BLOOD PRESSURE: 139 MMHG | RESPIRATION RATE: 19 BRPM | RESPIRATION RATE: 85 BRPM | SYSTOLIC BLOOD PRESSURE: 133 MMHG | OXYGEN SATURATION: 98 % | HEART RATE: 102 BPM | TEMPERATURE: 97.3 F | TEMPERATURE: 97.3 F | HEART RATE: 102 BPM | SYSTOLIC BLOOD PRESSURE: 127 MMHG | HEART RATE: 99 BPM | RESPIRATION RATE: 23 BRPM | DIASTOLIC BLOOD PRESSURE: 101 MMHG | DIASTOLIC BLOOD PRESSURE: 95 MMHG | SYSTOLIC BLOOD PRESSURE: 146 MMHG | DIASTOLIC BLOOD PRESSURE: 90 MMHG | SYSTOLIC BLOOD PRESSURE: 133 MMHG | RESPIRATION RATE: 22 BRPM | HEART RATE: 69 BPM | SYSTOLIC BLOOD PRESSURE: 132 MMHG | HEART RATE: 99 BPM | HEART RATE: 85 BPM | HEART RATE: 92 BPM | DIASTOLIC BLOOD PRESSURE: 87 MMHG | DIASTOLIC BLOOD PRESSURE: 92 MMHG | OXYGEN SATURATION: 100 % | RESPIRATION RATE: 16 BRPM | SYSTOLIC BLOOD PRESSURE: 126 MMHG | HEART RATE: 86 BPM | SYSTOLIC BLOOD PRESSURE: 158 MMHG | HEART RATE: 69 BPM | DIASTOLIC BLOOD PRESSURE: 92 MMHG | WEIGHT: 125 LBS | SYSTOLIC BLOOD PRESSURE: 150 MMHG | OXYGEN SATURATION: 99 % | SYSTOLIC BLOOD PRESSURE: 139 MMHG | HEART RATE: 98 BPM | SYSTOLIC BLOOD PRESSURE: 158 MMHG | RESPIRATION RATE: 16 BRPM | RESPIRATION RATE: 19 BRPM | OXYGEN SATURATION: 99 % | SYSTOLIC BLOOD PRESSURE: 126 MMHG | HEART RATE: 94 BPM | DIASTOLIC BLOOD PRESSURE: 82 MMHG | DIASTOLIC BLOOD PRESSURE: 90 MMHG | RESPIRATION RATE: 20 BRPM | DIASTOLIC BLOOD PRESSURE: 87 MMHG | DIASTOLIC BLOOD PRESSURE: 97 MMHG | DIASTOLIC BLOOD PRESSURE: 101 MMHG | SYSTOLIC BLOOD PRESSURE: 130 MMHG | OXYGEN SATURATION: 98 % | SYSTOLIC BLOOD PRESSURE: 132 MMHG | WEIGHT: 125 LBS | RESPIRATION RATE: 21 BRPM | HEIGHT: 62 IN | RESPIRATION RATE: 22 BRPM | DIASTOLIC BLOOD PRESSURE: 82 MMHG | DIASTOLIC BLOOD PRESSURE: 97 MMHG | RESPIRATION RATE: 20 BRPM | RESPIRATION RATE: 23 BRPM | HEART RATE: 98 BPM | SYSTOLIC BLOOD PRESSURE: 143 MMHG | HEART RATE: 94 BPM | HEART RATE: 86 BPM | SYSTOLIC BLOOD PRESSURE: 143 MMHG

## 2022-08-02 DIAGNOSIS — D12.3 BENIGN NEOPLASM OF TRANSVERSE COLON: ICD-10-CM

## 2022-08-02 DIAGNOSIS — K63.5 POLYP OF COLON: ICD-10-CM

## 2022-08-02 DIAGNOSIS — K62.5 HEMORRHAGE OF ANUS AND RECTUM: ICD-10-CM

## 2022-08-02 DIAGNOSIS — K63.89 OTHER SPECIFIED DISEASES OF INTESTINE: ICD-10-CM

## 2022-08-02 DIAGNOSIS — K31.89 OTHER DISEASES OF STOMACH AND DUODENUM: ICD-10-CM

## 2022-08-02 DIAGNOSIS — Z83.71 FAMILY HISTORY OF COLONIC POLYPS: ICD-10-CM

## 2022-08-02 DIAGNOSIS — R19.4 CHANGE IN BOWEL HABIT: ICD-10-CM

## 2022-08-02 DIAGNOSIS — K21.9 GASTRO-ESOPHAGEAL REFLUX DISEASE WITHOUT ESOPHAGITIS: ICD-10-CM

## 2022-08-02 DIAGNOSIS — R10.13 EPIGASTRIC PAIN: ICD-10-CM

## 2022-08-02 PROBLEM — K59.09 CONSTIPATION: Status: ACTIVE | Noted: 2022-08-02

## 2022-08-02 PROCEDURE — 43239 EGD BIOPSY SINGLE/MULTIPLE: CPT | Performed by: INTERNAL MEDICINE

## 2022-08-02 PROCEDURE — 88305 TISSUE EXAM BY PATHOLOGIST: CPT | Performed by: PATHOLOGY

## 2022-08-02 PROCEDURE — 88312 SPECIAL STAINS GROUP 1: CPT | Performed by: PATHOLOGY

## 2022-08-02 PROCEDURE — 45385 COLONOSCOPY W/LESION REMOVAL: CPT | Performed by: INTERNAL MEDICINE

## 2022-08-08 LAB
PDF: PDF REPORT: (no result)
PDF: PDF REPORT: (no result)